# Patient Record
Sex: MALE | Race: WHITE | NOT HISPANIC OR LATINO | URBAN - METROPOLITAN AREA
[De-identification: names, ages, dates, MRNs, and addresses within clinical notes are randomized per-mention and may not be internally consistent; named-entity substitution may affect disease eponyms.]

---

## 2017-06-06 ENCOUNTER — HISTORICAL (OUTPATIENT)
Dept: LAB | Facility: HOSPITAL | Age: 67
End: 2017-06-06

## 2017-06-06 LAB — PSA SERPL-MCNC: 5.36 NG/ML (ref 0–4)

## 2021-05-19 ENCOUNTER — HISTORICAL (OUTPATIENT)
Dept: ADMINISTRATIVE | Facility: HOSPITAL | Age: 71
End: 2021-05-19

## 2021-05-28 ENCOUNTER — HISTORICAL (OUTPATIENT)
Dept: RADIOLOGY | Facility: HOSPITAL | Age: 71
End: 2021-05-28

## 2021-05-28 LAB — POC CREATININE: 1.1 MG/DL (ref 0.6–1.3)

## 2021-08-11 ENCOUNTER — HISTORICAL (OUTPATIENT)
Dept: LAB | Facility: HOSPITAL | Age: 71
End: 2021-08-11

## 2021-08-11 LAB
BUN SERPL-MCNC: 16.4 MG/DL (ref 8.4–25.7)
CALCIUM SERPL-MCNC: 9.7 MG/DL (ref 8.8–10)
CHLORIDE SERPL-SCNC: 103 MMOL/L (ref 98–107)
CO2 SERPL-SCNC: 24 MMOL/L (ref 23–31)
CREAT SERPL-MCNC: 0.79 MG/DL (ref 0.73–1.18)
CREAT/UREA NIT SERPL: 21
GLUCOSE SERPL-MCNC: 95 MG/DL (ref 82–115)
POTASSIUM SERPL-SCNC: 3.5 MMOL/L (ref 3.5–5.1)
SODIUM SERPL-SCNC: 140 MMOL/L (ref 136–145)

## 2021-08-19 ENCOUNTER — HISTORICAL (OUTPATIENT)
Dept: LAB | Facility: HOSPITAL | Age: 71
End: 2021-08-19

## 2021-08-19 LAB
BUN SERPL-MCNC: 25.3 MG/DL (ref 8.4–25.7)
CALCIUM SERPL-MCNC: 9.8 MG/DL (ref 8.8–10)
CHLORIDE SERPL-SCNC: 105 MMOL/L (ref 98–107)
CO2 SERPL-SCNC: 25 MMOL/L (ref 23–31)
CREAT SERPL-MCNC: 1.09 MG/DL (ref 0.73–1.18)
CREAT/UREA NIT SERPL: 23
GLUCOSE SERPL-MCNC: 89 MG/DL (ref 82–115)
POTASSIUM SERPL-SCNC: 4.1 MMOL/L (ref 3.5–5.1)
SODIUM SERPL-SCNC: 140 MMOL/L (ref 136–145)

## 2021-08-26 ENCOUNTER — HISTORICAL (OUTPATIENT)
Dept: LAB | Facility: HOSPITAL | Age: 71
End: 2021-08-26

## 2021-08-26 LAB
BUN SERPL-MCNC: 18.8 MG/DL (ref 8.4–25.7)
CALCIUM SERPL-MCNC: 9.9 MG/DL (ref 8.8–10)
CHLORIDE SERPL-SCNC: 104 MMOL/L (ref 98–107)
CO2 SERPL-SCNC: 28 MMOL/L (ref 23–31)
CREAT SERPL-MCNC: 1.09 MG/DL (ref 0.73–1.18)
CREAT/UREA NIT SERPL: 17
GLUCOSE SERPL-MCNC: 112 MG/DL (ref 82–115)
POTASSIUM SERPL-SCNC: 3.9 MMOL/L (ref 3.5–5.1)
SODIUM SERPL-SCNC: 142 MMOL/L (ref 136–145)

## 2021-09-13 ENCOUNTER — HISTORICAL (OUTPATIENT)
Dept: ADMINISTRATIVE | Facility: HOSPITAL | Age: 71
End: 2021-09-13

## 2021-09-13 ENCOUNTER — HISTORICAL (OUTPATIENT)
Dept: LAB | Facility: HOSPITAL | Age: 71
End: 2021-09-13

## 2021-09-13 LAB
ABS NEUT (OLG): 6.51 X10(3)/MCL (ref 2.1–9.2)
ALBUMIN SERPL-MCNC: 3.9 GM/DL (ref 3.4–4.8)
ALBUMIN/GLOB SERPL: 1.2 RATIO (ref 1.1–2)
ALP SERPL-CCNC: 81 UNIT/L (ref 40–150)
ALT SERPL-CCNC: 66 UNIT/L (ref 0–55)
AST SERPL-CCNC: 37 UNIT/L (ref 5–34)
BASOPHILS # BLD AUTO: 0.07 X10(3)/MCL (ref 0–0.2)
BASOPHILS NFR BLD AUTO: 0.8 % (ref 0–0.9)
BILIRUB SERPL-MCNC: 0.7 MG/DL (ref 0.2–1.2)
BILIRUBIN DIRECT+TOT PNL SERPL-MCNC: 0.3 MG/DL (ref 0–0.5)
BILIRUBIN DIRECT+TOT PNL SERPL-MCNC: 0.4 MG/DL (ref 0–0.8)
BUN SERPL-MCNC: 21 MG/DL (ref 8.4–25.7)
CALCIUM SERPL-MCNC: 10.3 MG/DL (ref 8.8–10)
CHLORIDE SERPL-SCNC: 103 MMOL/L (ref 98–107)
CO2 SERPL-SCNC: 26 MMOL/L (ref 23–31)
CREAT SERPL-MCNC: 1.03 MG/DL (ref 0.72–1.25)
EOSINOPHIL # BLD AUTO: 0.15 X10(3)/MCL (ref 0–0.9)
EOSINOPHIL NFR BLD AUTO: 1.6 % (ref 0–6.5)
ERYTHROCYTE [DISTWIDTH] IN BLOOD BY AUTOMATED COUNT: 13.6 % (ref 11.5–17)
EST. AVERAGE GLUCOSE BLD GHB EST-MCNC: 122.6 MG/DL
GLOBULIN SER-MCNC: 3.3 GM/DL (ref 2.4–3.5)
GLUCOSE SERPL-MCNC: 104 MG/DL (ref 82–115)
HBA1C MFR BLD: 5.9 %
HCT VFR BLD AUTO: 56.4 % (ref 42–52)
HGB BLD-MCNC: 19.1 GM/DL (ref 14–18)
IMM GRANULOCYTES # BLD AUTO: 0.02 10*3/UL (ref 0–0.02)
IMM GRANULOCYTES NFR BLD AUTO: 0.2 % (ref 0–0.43)
LYMPHOCYTES # BLD AUTO: 1.74 X10(3)/MCL (ref 0.6–4.6)
LYMPHOCYTES NFR BLD AUTO: 18.7 % (ref 16.2–38.3)
MCH RBC QN AUTO: 31.3 PG (ref 27–31)
MCHC RBC AUTO-ENTMCNC: 33.9 GM/DL (ref 33–36)
MCV RBC AUTO: 92.5 FL (ref 80–94)
MONOCYTES # BLD AUTO: 0.83 X10(3)/MCL (ref 0.1–1.3)
MONOCYTES NFR BLD AUTO: 8.9 % (ref 4.7–11.3)
MRSA SCREEN BY PCR: NEGATIVE
NEUTROPHILS # BLD AUTO: 6.51 X10(3)/MCL (ref 2.1–9.2)
NEUTROPHILS NFR BLD AUTO: 69.8 % (ref 49.1–73.4)
NRBC BLD AUTO-RTO: 0 % (ref 0–0.2)
PLATELET # BLD AUTO: 194 X10(3)/MCL (ref 130–400)
PMV BLD AUTO: 9.9 FL (ref 7.4–10.4)
POTASSIUM SERPL-SCNC: 4.4 MMOL/L (ref 3.5–5.1)
PROT SERPL-MCNC: 7.2 GM/DL (ref 5.8–7.6)
RBC # BLD AUTO: 6.1 X10(6)/MCL (ref 4.7–6.1)
SODIUM SERPL-SCNC: 141 MMOL/L (ref 136–145)
WBC # SPEC AUTO: 9.3 X10(3)/MCL (ref 4.5–11.5)

## 2021-09-15 ENCOUNTER — HISTORICAL (OUTPATIENT)
Dept: LAB | Facility: HOSPITAL | Age: 71
End: 2021-09-15

## 2021-09-15 LAB
BUN SERPL-MCNC: 18.3 MG/DL (ref 8.4–25.7)
CALCIUM SERPL-MCNC: 9.1 MG/DL (ref 8.8–10)
CHLORIDE SERPL-SCNC: 101 MMOL/L (ref 98–107)
CO2 SERPL-SCNC: 26 MMOL/L (ref 23–31)
CREAT SERPL-MCNC: 0.84 MG/DL (ref 0.73–1.18)
CREAT/UREA NIT SERPL: 22
GLUCOSE SERPL-MCNC: 124 MG/DL (ref 82–115)
POTASSIUM SERPL-SCNC: 4.3 MMOL/L (ref 3.5–5.1)
SODIUM SERPL-SCNC: 137 MMOL/L (ref 136–145)

## 2021-10-15 ENCOUNTER — HISTORICAL (OUTPATIENT)
Dept: ADMINISTRATIVE | Facility: HOSPITAL | Age: 71
End: 2021-10-15

## 2021-10-16 ENCOUNTER — HOSPITAL ENCOUNTER (OUTPATIENT)
Dept: MEDSURG UNIT | Facility: HOSPITAL | Age: 71
End: 2021-10-18
Attending: INTERNAL MEDICINE | Admitting: SPECIALIST

## 2021-10-16 LAB
ABS NEUT (OLG): 5.55 X10(3)/MCL (ref 2.1–9.2)
ALBUMIN SERPL-MCNC: 3.5 GM/DL (ref 3.4–4.8)
ALBUMIN/GLOB SERPL: 1.2 RATIO (ref 1.1–2)
ALP SERPL-CCNC: 75 UNIT/L (ref 40–150)
ALT SERPL-CCNC: 29 UNIT/L (ref 0–55)
AMPHET UR QL SCN: NEGATIVE
APPEARANCE, UA: ABNORMAL
AST SERPL-CCNC: 33 UNIT/L (ref 5–34)
BACTERIA SPEC CULT: ABNORMAL /HPF
BARBITURATE SCN PRESENT UR: NEGATIVE
BASOPHILS # BLD AUTO: 0.1 X10(3)/MCL (ref 0–0.2)
BASOPHILS NFR BLD AUTO: 1 %
BENZODIAZ UR QL SCN: NEGATIVE
BILIRUB SERPL-MCNC: 0.9 MG/DL
BILIRUB UR QL STRIP: NEGATIVE
BILIRUBIN DIRECT+TOT PNL SERPL-MCNC: 0.4 MG/DL (ref 0–0.5)
BILIRUBIN DIRECT+TOT PNL SERPL-MCNC: 0.5 MG/DL (ref 0–0.8)
BUN SERPL-MCNC: 28.8 MG/DL (ref 8.4–25.7)
CALCIUM SERPL-MCNC: 9.6 MG/DL (ref 8.8–10)
CANNABINOIDS UR QL SCN: NEGATIVE
CHLORIDE SERPL-SCNC: 100 MMOL/L (ref 98–107)
CO2 SERPL-SCNC: 25 MMOL/L (ref 23–31)
COCAINE UR QL SCN: NEGATIVE
COLOR UR: ABNORMAL
CREAT SERPL-MCNC: 1.09 MG/DL (ref 0.73–1.18)
EOSINOPHIL # BLD AUTO: 0.2 X10(3)/MCL (ref 0–0.9)
EOSINOPHIL NFR BLD AUTO: 3 %
ERYTHROCYTE [DISTWIDTH] IN BLOOD BY AUTOMATED COUNT: 14 % (ref 11.5–17)
FENTANYL UR QL SCN: NEGATIVE
FOLATE SERPL-MCNC: 11 NG/ML (ref 7–31.4)
GLOBULIN SER-MCNC: 3 GM/DL (ref 2.4–3.5)
GLUCOSE (UA): NEGATIVE
GLUCOSE SERPL-MCNC: 114 MG/DL (ref 82–115)
HCT VFR BLD AUTO: 42.1 % (ref 42–52)
HGB BLD-MCNC: 13.7 GM/DL (ref 14–18)
HGB UR QL STRIP: ABNORMAL
KETONES UR QL STRIP: NEGATIVE
LEUKOCYTE ESTERASE UR QL STRIP: ABNORMAL
LYMPHOCYTES # BLD AUTO: 0.6 X10(3)/MCL (ref 0.6–4.6)
LYMPHOCYTES NFR BLD AUTO: 8 %
MAGNESIUM SERPL-MCNC: 2.1 MG/DL (ref 1.6–2.6)
MCH RBC QN AUTO: 30.9 PG (ref 27–31)
MCHC RBC AUTO-ENTMCNC: 32.5 GM/DL (ref 33–36)
MCV RBC AUTO: 94.8 FL (ref 80–94)
MDMA UR QL SCN: NEGATIVE
MONOCYTES # BLD AUTO: 0.9 X10(3)/MCL (ref 0.1–1.3)
MONOCYTES NFR BLD AUTO: 12 %
NEUTROPHILS # BLD AUTO: 5.55 X10(3)/MCL (ref 2.1–9.2)
NEUTROPHILS NFR BLD AUTO: 74 %
NITRITE UR QL STRIP: NEGATIVE
OPIATES UR QL SCN: POSITIVE
PCP UR QL: NEGATIVE
PH UR STRIP.AUTO: 6 [PH] (ref 5–7.5)
PH UR STRIP: 6 [PH] (ref 5–9)
PLATELET # BLD AUTO: 367 X10(3)/MCL (ref 130–400)
PMV BLD AUTO: 9.7 FL (ref 9.4–12.4)
POC TROPONIN: 0.01 NG/ML (ref 0–0.08)
POC TROPONIN: 0.02 NG/ML (ref 0–0.08)
POTASSIUM SERPL-SCNC: 4.6 MMOL/L (ref 3.5–5.1)
POTASSIUM SERPL-SCNC: 5.3 MMOL/L (ref 3.5–5.1)
POTASSIUM SERPL-SCNC: 5.4 MMOL/L (ref 3.5–5.1)
PROT SERPL-MCNC: 6.5 GM/DL (ref 5.8–7.6)
PROT UR QL STRIP: NEGATIVE
RBC # BLD AUTO: 4.44 X10(6)/MCL (ref 4.7–6.1)
RBC #/AREA URNS HPF: 20 /HPF (ref 0–2)
SARS-COV-2 AG RESP QL IA.RAPID: NEGATIVE
SODIUM SERPL-SCNC: 134 MMOL/L (ref 136–145)
SP GR FLD REFRACTOMETRY: 1.02 (ref 1–1.03)
SP GR UR STRIP: 1.02 (ref 1–1.03)
SQUAMOUS EPITHELIAL, UA: ABNORMAL /HPF (ref 0–4)
TROPONIN I SERPL-MCNC: <0.01 NG/ML (ref 0–0.04)
TSH SERPL-ACNC: 0.89 UIU/ML (ref 0.35–4.94)
TSH SERPL-ACNC: 1.39 UIU/ML (ref 0.35–4.94)
UROBILINOGEN UR STRIP-ACNC: 1
VIT B12 SERPL-MCNC: 645 PG/ML (ref 213–816)
WBC # SPEC AUTO: 7.4 X10(3)/MCL (ref 4.5–11.5)
WBC #/AREA URNS HPF: 133 /HPF (ref 0–3)

## 2021-10-17 LAB
ABS NEUT (OLG): 4.51 X10(3)/MCL (ref 2.1–9.2)
ALBUMIN SERPL-MCNC: 3.1 GM/DL (ref 3.4–4.8)
ALBUMIN/GLOB SERPL: 1 RATIO (ref 1.1–2)
ALP SERPL-CCNC: 62 UNIT/L (ref 40–150)
ALT SERPL-CCNC: 23 UNIT/L (ref 0–55)
AST SERPL-CCNC: 26 UNIT/L (ref 5–34)
BASOPHILS # BLD AUTO: 0.1 X10(3)/MCL (ref 0–0.2)
BASOPHILS NFR BLD AUTO: 1 %
BILIRUB SERPL-MCNC: 0.8 MG/DL
BILIRUBIN DIRECT+TOT PNL SERPL-MCNC: 0.4 MG/DL (ref 0–0.5)
BILIRUBIN DIRECT+TOT PNL SERPL-MCNC: 0.4 MG/DL (ref 0–0.8)
BUN SERPL-MCNC: 17.6 MG/DL (ref 8.4–25.7)
CALCIUM SERPL-MCNC: 10.1 MG/DL (ref 8.8–10)
CHLORIDE SERPL-SCNC: 106 MMOL/L (ref 98–107)
CO2 SERPL-SCNC: 22 MMOL/L (ref 23–31)
CREAT SERPL-MCNC: 0.82 MG/DL (ref 0.73–1.18)
EOSINOPHIL # BLD AUTO: 0.2 X10(3)/MCL (ref 0–0.9)
EOSINOPHIL NFR BLD AUTO: 3 %
ERYTHROCYTE [DISTWIDTH] IN BLOOD BY AUTOMATED COUNT: 14 % (ref 11.5–17)
GLOBULIN SER-MCNC: 3.2 GM/DL (ref 2.4–3.5)
GLUCOSE SERPL-MCNC: 110 MG/DL (ref 82–115)
HCT VFR BLD AUTO: 43 % (ref 42–52)
HGB BLD-MCNC: 13.8 GM/DL (ref 14–18)
LYMPHOCYTES # BLD AUTO: 0.9 X10(3)/MCL (ref 0.6–4.6)
LYMPHOCYTES NFR BLD AUTO: 14 %
MAGNESIUM SERPL-MCNC: 1.8 MG/DL (ref 1.6–2.6)
MCH RBC QN AUTO: 30.3 PG (ref 27–31)
MCHC RBC AUTO-ENTMCNC: 32.1 GM/DL (ref 33–36)
MCV RBC AUTO: 94.5 FL (ref 80–94)
MONOCYTES # BLD AUTO: 0.7 X10(3)/MCL (ref 0.1–1.3)
MONOCYTES NFR BLD AUTO: 11 %
NEUTROPHILS # BLD AUTO: 4.51 X10(3)/MCL (ref 2.1–9.2)
NEUTROPHILS NFR BLD AUTO: 70 %
PLATELET # BLD AUTO: 310 X10(3)/MCL (ref 130–400)
PMV BLD AUTO: 9.3 FL (ref 9.4–12.4)
POTASSIUM SERPL-SCNC: 4.6 MMOL/L (ref 3.5–5.1)
PROT SERPL-MCNC: 6.3 GM/DL (ref 5.8–7.6)
RBC # BLD AUTO: 4.55 X10(6)/MCL (ref 4.7–6.1)
SODIUM SERPL-SCNC: 136 MMOL/L (ref 136–145)
WBC # SPEC AUTO: 6.4 X10(3)/MCL (ref 4.5–11.5)

## 2021-10-18 LAB
ABS NEUT (OLG): 6.17 X10(3)/MCL (ref 2.1–9.2)
BASOPHILS # BLD AUTO: 0.1 X10(3)/MCL (ref 0–0.2)
BASOPHILS NFR BLD AUTO: 1 %
BUN SERPL-MCNC: 20 MG/DL (ref 8.4–25.7)
CALCIUM SERPL-MCNC: 10.1 MG/DL (ref 8.8–10)
CALCIUM SERPL-MCNC: 10.3 MG/DL (ref 8.7–10.5)
CHLORIDE SERPL-SCNC: 102 MMOL/L (ref 98–107)
CO2 SERPL-SCNC: 27 MMOL/L (ref 23–31)
CREAT SERPL-MCNC: 0.79 MG/DL (ref 0.73–1.18)
CREAT/UREA NIT SERPL: 25
EOSINOPHIL # BLD AUTO: 0.2 X10(3)/MCL (ref 0–0.9)
EOSINOPHIL NFR BLD AUTO: 2 %
ERYTHROCYTE [DISTWIDTH] IN BLOOD BY AUTOMATED COUNT: 14.1 % (ref 11.5–17)
FINAL CULTURE: NORMAL
GLUCOSE SERPL-MCNC: 112 MG/DL (ref 82–115)
HCT VFR BLD AUTO: 44.8 % (ref 42–52)
HGB BLD-MCNC: 14.7 GM/DL (ref 14–18)
LYMPHOCYTES # BLD AUTO: 1 X10(3)/MCL (ref 0.6–4.6)
LYMPHOCYTES NFR BLD AUTO: 12 %
MCH RBC QN AUTO: 30.8 PG (ref 27–31)
MCHC RBC AUTO-ENTMCNC: 32.8 GM/DL (ref 33–36)
MCV RBC AUTO: 93.7 FL (ref 80–94)
MONOCYTES # BLD AUTO: 0.8 X10(3)/MCL (ref 0.1–1.3)
MONOCYTES NFR BLD AUTO: 10 %
NEUTROPHILS # BLD AUTO: 6.17 X10(3)/MCL (ref 2.1–9.2)
NEUTROPHILS NFR BLD AUTO: 74 %
PLATELET # BLD AUTO: 360 X10(3)/MCL (ref 130–400)
PMV BLD AUTO: 9.6 FL (ref 9.4–12.4)
POTASSIUM SERPL-SCNC: 4.8 MMOL/L (ref 3.5–5.1)
RBC # BLD AUTO: 4.78 X10(6)/MCL (ref 4.7–6.1)
SODIUM SERPL-SCNC: 139 MMOL/L (ref 136–145)
WBC # SPEC AUTO: 8.4 X10(3)/MCL (ref 4.5–11.5)

## 2021-10-21 LAB
FINAL CULTURE: NORMAL
FINAL CULTURE: NORMAL

## 2022-02-14 ENCOUNTER — HISTORICAL (OUTPATIENT)
Dept: LAB | Facility: HOSPITAL | Age: 72
End: 2022-02-14

## 2022-02-14 LAB
BUN SERPL-MCNC: 10.9 MG/DL (ref 8.4–25.7)
CALCIUM SERPL-MCNC: 10 MG/DL (ref 8.7–10.5)
CHLORIDE SERPL-SCNC: 102 MMOL/L (ref 98–107)
CO2 SERPL-SCNC: 28 MMOL/L (ref 23–31)
CREAT SERPL-MCNC: 0.69 MG/DL (ref 0.73–1.18)
CREAT/UREA NIT SERPL: 16
GLUCOSE SERPL-MCNC: 106 MG/DL (ref 82–115)
HEMOLYSIS INTERF INDEX SERPL-ACNC: 12
ICTERIC INTERF INDEX SERPL-ACNC: 1
LIPEMIC INTERF INDEX SERPL-ACNC: 10
POTASSIUM SERPL-SCNC: 3.7 MMOL/L (ref 3.5–5.1)
SODIUM SERPL-SCNC: 139 MMOL/L (ref 136–145)

## 2022-04-10 ENCOUNTER — HISTORICAL (OUTPATIENT)
Dept: ADMINISTRATIVE | Facility: HOSPITAL | Age: 72
End: 2022-04-10
Payer: MEDICARE

## 2022-04-30 VITALS
HEIGHT: 67 IN | HEIGHT: 67 IN | WEIGHT: 240.75 LBS | DIASTOLIC BLOOD PRESSURE: 90 MMHG | BODY MASS INDEX: 37.79 KG/M2 | DIASTOLIC BLOOD PRESSURE: 97 MMHG | BODY MASS INDEX: 39.24 KG/M2 | WEIGHT: 240.06 LBS | SYSTOLIC BLOOD PRESSURE: 167 MMHG | SYSTOLIC BLOOD PRESSURE: 120 MMHG | SYSTOLIC BLOOD PRESSURE: 151 MMHG | DIASTOLIC BLOOD PRESSURE: 65 MMHG | WEIGHT: 250 LBS | BODY MASS INDEX: 37.68 KG/M2 | HEIGHT: 67 IN

## 2022-05-02 NOTE — HISTORICAL [facility]
This is a historical note converted from Crispin. Formatting and pictures may have been removed.  Please reference Crispin for original formatting and attached multimedia. Chief Complaint  Here for right knee pain. Patient stated julio cesar has been hurting for years.  History of Present Illness  70-year-old male presents office today for evaluation his right knee pain. ?Is been present for many years and medial sided. ?He does admit to?swelling. ?Denies any locking, catching, giving way episodes. ?This has?affected his activities of daily living.  Review of Systems  Systemic: No fever, no chills, and no recent weight change.  Head: No headache - frequent.  Eyes: No vision problems.  Otolarnygeal: No hearing loss, no earache, no epistaxis, no hoarseness, and no tooth pain. Gums normal.  Cardiovascular: No chest pain or discomfort and no palpitations.  Pulmonary: No pulmonary symptoms - no dyspnea, no shortness of breath  Gastrointestinal: Appetite not decreased. No dysphagia and no constant eructation. No nausea, no vomiting, no abdominal pain, no hematochezia.  Genitourinary: No genitourinary symptoms - No urinary hesitancy. No urinary loss of control - no burning sensation during urination.  Musculoskeletal: No calf muscle cramps and no localized soft tissue swelling  Neurological: No fainting and no convulsions.  Psychological: no depression.  Skin: No rash.  Physical Exam  Vitals & Measurements  T:?36.2? ?C (Oral)? HR:?71(Peripheral)? BP:?151/97?  HT:?170.00?cm? WT:?113.390?kg? BMI:?39.24?  PHYSICAL FINDINGS  Cardiovascular:  Arterial Pulses: Posterior tibialis pulses were normal. Dorsalis pedis pulses were normal right.  Musculoskeletal System:  Thigh:  ?Thigh: Thigh showed quadriceps atrophy.  Knee:  right?Knee:  Grade effusion 1  Bilateral lower extremity edema  Genu varum. Patella demonstrated crepitus. Anteromedial aspect was tender on palpation. Medial aspect was tender on palpation. Medial collateral ligament  was tender on palpation. Active motion.  right?Knee:  Knee Motion: Value  Active flexion?120 degrees  Active extension?5 degrees  Pain was elicited by flexion. No erythema. No warmth. No medial instability. No lateral instability. No one plane medial (straight) instability. No one plane lateral (straight) instability. A Lachman test did not demonstrate one plane anterior instability.  Neurological:  Gait And Stance: A right-sided antalgic gait was observed.  ???  ???  TESTS  Imaging:  X-Ray Knee:  A complete knee x-ray with standing views was performed -of?right knee.  AP and lateral view x-rays of the Right knee with sunrise view of the patella were performed -of?right knee.  ???  ???  IMPRESSIONS RADIOLOGY TEST  Narrowing of the joint space bone on bone in medial and patellofemoral compartment, and osteophytes arising from the?right knee. large cyst seen in fibular head  Kellgren Elvin grade 4 changes  Assessment/Plan  1.?Primary osteoarthritis of right knee?M17.11  ?Discussed robotic assisted right total knee arthroplasty  MRI with and without contrast to evaluate?fibular head cyst. ?Follow-up in 1 to 2 weeks to discuss test results.? Also recommend that he gets back in with his PCP or cardiologist in regards to his lower extremity edema  Ordered:  Clinic Follow up, *Est. 06/02/21 3:00:00 CDT, Order for future visit, Primary osteoarthritis of right knee  Bone cyst of right fibula, [facility]  ?  2.?Bone cyst of right fibula?M85.661  ?MRI?with and without contrast of the right knee to evaluate right fibular head cyst  Ordered:  Clinic Follow up, *Est. 06/02/21 3:00:00 CDT, Order for future visit, Primary osteoarthritis of right knee  Bone cyst of right fibula, [facility]  MRI Ext Lower Joint Right W W/O Cont, Routine, *Est. 05/19/21 3:00:00 CDT, Other (please specify), Bone lesion, knee, benign features, None, Ambulatory, right knee fibular head cyst, Creatinine if needed per protocol, Rad Type, Order  for future visit, Bone cyst of right fibula, Schedule...  ?  Referrals  Clinic Follow up, *Est. 06/02/21 3:00:00 CDT, Order for future visit, Primary osteoarthritis of right knee  Bone cyst of right fibula, [facility]   Problem List/Past Medical History  Ongoing  Bone cyst of right fibula  Central sleep apnea  Cheyne-Beavers respiration  Edema  Fatigue  Hypertension  Hypoxemia  Obstructive sleep apnea  Primary osteoarthritis of right knee  REM behavioral disorder  Snoring  Tobacco user  Historical  No qualifying data  Medications  amLODIPine 10 mg oral tablet, 10 mg= 1 tab(s), Oral, Daily  amlodipine-valsartan 10 mg-320 mg oral tablet, 1 tab(s), Oral, Daily,? ?Not taking  amLODIPine-valsartan 5 mg-320 mg oral tablet, 1 tab(s), Oral, Daily,? ?Not taking  celecoxib 200 mg oral capsule, Daily,? ?Not taking  Eliquis 5 mg oral tablet, Oral, BID  hydrochlorothiazide 25 mg oral tablet, 25 mg= 1 tab(s), Oral, Daily,? ?Not taking  losartan 100 mg oral tablet, Oral, Daily  metoprolol succinate 100 mg oral tablet, extended release, 100 mg= 1 tab(s), Oral, Daily,? ?Not taking  metoprolol tartrate 100 mg oral tab, At Bedtime  Pantoprazole 40 mg ORAL EC-Tablet,? ?Not taking  pramipexole 0.25 mg oral tablet, 0.25 mg= 1 tab(s), Oral, At Bedtime, 5 refills,? ?Not taking  tamsulosin 0.4 mg oral capsule, 0.4 mg= 1 cap(s), Oral, Daily  valsartan 320 mg oral tablet, 320 mg= 1 tab(s), Oral, Daily  venlafaxine 75 mg oral capsule, extended release, 75 mg= 1 cap(s), Oral, Daily,? ?Not taking  venlafaxine 75 mg oral tablet, Daily  Allergies  Advil?(unknown)  ibuprofen?(unknown)  Social History  Abuse/Neglect  No, 05/19/2021  Tobacco  Former smoker, quit more than 30 days ago, No, 05/19/2021  Family History  Hypertension: Father.  Health Maintenance  Health Maintenance  ???Pending?(in the next year)  ??? ??OverDue  ??? ? ? ?Hypertension Management-BMP due??10/27/17??and every 1??year(s)  ??? ? ? ?Diabetes Screening due??10/27/19??and  every 3??year(s)  ??? ? ? ?Influenza Vaccine due??10/01/20??and every 1??day(s)  ??? ? ? ?Smoking Cessation due??01/01/21??and every 1??year(s)  ??? ? ? ?Advance Directive due??01/02/21??and every 1??year(s)  ??? ? ? ?Alcohol Misuse Screening due??01/02/21??and every 1??year(s)  ??? ? ? ?Cognitive Screening due??01/02/21??and every 1??year(s)  ??? ? ? ?Functional Assessment due??01/02/21??and every 1??year(s)  ??? ??Due?  ??? ? ? ?ADL Screening due??05/19/21??and every 1??year(s)  ??? ? ? ?Abdominal Aortic Aneurysm Screening due??05/19/21??and every 100??year(s)  ??? ? ? ?Aspirin Therapy for CVD Prevention due??05/19/21??and every 1??year(s)  ??? ? ? ?Colorectal Screening due??05/19/21??Unknown Frequency  ??? ? ? ?Depression Screening due??05/19/21??Unknown Frequency  ??? ? ? ?Hypertension Management-Education due??05/19/21??and every 1??year(s)  ??? ? ? ?Lipid Screening due??05/19/21??Unknown Frequency  ??? ? ? ?Lung Cancer Screening due??05/19/21??and every 1??year(s)  ??? ? ? ?Medicare Annual Wellness Exam due??05/19/21??and every 1??year(s)  ??? ? ? ?Pneumococcal Vaccine due??05/19/21??Unknown Frequency  ??? ? ? ?Tetanus Vaccine due??05/19/21??and every 10??year(s)  ??? ? ? ?Zoster Vaccine due??05/19/21??Unknown Frequency  ??? ??Due In Future?  ??? ? ? ?Blood Pressure Screening not due until??12/08/21??and every 1??year(s)  ??? ? ? ?Hypertension Management-Blood Pressure not due until??12/08/21??and every 1??year(s)  ??? ? ? ?Obesity Screening not due until??01/01/22??and every 1??year(s)  ??? ? ? ?Fall Risk Assessment not due until??01/02/22??and every 1??year(s)  ??? ? ? ?Body Mass Index Check not due until??02/03/22??and every 1??year(s)  ???Satisfied?(in the past 1 year)  ??? ??Satisfied?  ??? ? ? ?Blood Pressure Screening on??05/19/21.??Satisfied by Marissa Cee LPN  ??? ? ? ?Body Mass Index Check on??05/19/21.??Satisfied by Marissa Cee LPN  ??? ? ? ?Depression Screening on??05/19/21.??Satisfied  by Marissa Cee LPN  ??? ? ? ?Fall Risk Assessment on??05/19/21.??Satisfied by Marissa Cee LPN  ??? ? ? ?Hypertension Management-Blood Pressure on??05/19/21.??Satisfied by Marissa Cee LPN  ??? ? ? ?Influenza Vaccine on??02/03/21.??Satisfied by Nolberto Mosquera  ??? ? ? ?Obesity Screening on??05/19/21.??Satisfied by Marissa Cee LPN  ?

## 2022-05-02 NOTE — HISTORICAL [facility]
This is a historical note converted from Crispin. Formatting and pictures may have been removed.  Please reference Crispin for original formatting and attached multimedia. Chief Complaint  Confusion  History of Present Illness  Nico Devi is a 71-year-old gentleman?with a history of chronic atrial?flutter on Eliquis,?hypertension,?and RAVINDER?presents to the ED?with complaints of?progressively worsening?intermittent?confusion,?rambling speech,?visual/auditory?hallucinations, and?a fever (T-max?around 101.0)?that began last night?on 10/15/2021.??His hallucinations were described as?both?seeing and talking?to people?who were not present, but he denies any current hallucinations.? He recently?underwent a right TKA?on 09/30/2021?without any complications,?but?prior to discharge?he developed urinary retention requiring?a urology consult and Quinonez catheter placement.? He was eventually discharged home?with a Quinonez catheter in place?on 10/03/2021?and with prescriptions including Norco 7.5 mg - 325 mg (last dose was in the morning on 10/15/2021)?and methocarbamol 750 mg TID (last dose x2 days ago), which he has been taking as prescribed.? He followed up?with his urologist?on 10/11/2021?to have the Quinonez catheter removed?and has not had?any urinary retention since then.? He did notice?some hematuria?so he went back?to the urologist?who prescribed him Bactrim?on 10/12/2021, which he has been taking.? He has not experienced similar symptoms in the past?and denies previous history of CVAs.? Per wife at bedside,?he is currently at his baseline?cognitively?and?is conversing appropriately.? He denies chest pain,?cough,?shortness of breath,?abdominal pain,?or N/V/D. ?He also had a follow-up?with his orthopedic surgeon?yesterday on 10/15/2021?where he had the staples removed?from his incision site?and was informed that is healing well.  ?  His vital signs are currently stable post blood pressure is elevated at 166/84. ?His labs showed  a potassium 5.3, but otherwise was mostly unremarkable. ?His urinalysis showed 2+ blood, 3+ leukoesterase, , and RBC 20. ?COVID-19 negative. CT head showed no acute cortical infarct, hemorrhage or mass lesion. CXR showed no acute cardiopulmonary process identified.  Review of Systems  As per HPI otherwise all systems reviewed and negative.  Physical Exam  Vitals & Measurements  T:?36.9? ?C (Oral)? HR:?69(Peripheral)? RR:?18? BP:?106/63? SpO2:?95%?  General:?well-developed well-nourished in no acute distress, AAO x4,?answers questions appropriately, no slurred speech?or facial droop appreciated  Eye: PERRLA, EOMI, clear conjunctiva, eyelids normal  HENT:? oropharynx without erythema/exudate, oropharynx and nasal mucosal surfaces moist  Neck: full range of motion  Respiratory:?clear to auscultation bilaterally  Cardiovascular:?regularly?irregular rate and rhythm without murmurs, gallops or rubs  Gastrointestinal:?soft, non-tender, non-distended with normal bowel sounds, without masses to palpation  Genitourinary: no CVA tenderness to palpation  Musculoskeletal:?Full range of motion?of bilateral upper extremities and LLE, limited ROM?of RLE secondary to recent TKA  Integumentary: warm, dry, healing?incision site over right anterior knee s/p TKA?with Steri-Strips in place,?no active?drainage  Neurologic: cranial nerves intact, motor/sensory function intact  Assessment/Plan  Acute delirium/psychosis possibly?secondary?to a UTI versus?new medications?versus other causes  Acute bacterial cystitis  Mild hyperkalemia  Mild macrocytic anemia  S/p recent?right TKA on 09/30/2021  Chronic atrial flutter on Eliquis  Hypertension  RAVINDER  ?  Plan:  -Continue with gentle IV hydration  -Blood cultures x2, urine culture-pending  -Continue with ceftriaxone, can change or adjust antibiotics necessary once cultures result  -Received?x1 dose?of Lokelma?while in the ED?which should improve mild hypokalemia  -B12, folate, TSH,  UDS-pending  -His acute psychosis?may be a direct result?of his recent pain medications?after surgery?and with a recent addition of Bactrim?on 10/12/2021, so hold?narcotics?and Bactrim  -will continue physical therapy while he is here for his right TKA?in order to prevent stiffness  -repeat labs in AM  ?  DVT prophylaxis: SCDs  Code Status: Full  PCP: Edgar MONTENEGRO, [place]  ?  I, Min Pulido PA-C, discussed this case with Dr. Gonzalez.  Please see addendum for further assessment and plan per MD.  ?   ADDENDUM:  I,?Damaso Gonzalez?assumed care of this patient today at?12:45pm, at the time of this addendum.  For this patient encounter, I reviewed the PA documentation, treatment plan, and medical decision making; and I had face-to-face time with this patient. ?Labs and imaging were reviewed and I agree with history, physical and medical decision making as detailed above. ?At least 45 minutes have been spent on above H&P, in collaboration with above nurse practitioner  ?  ?   a. History?  71-year-old male with known past medical history of chronic atrial flutter on Eliquis, hypertension, obstructive sleep apnea, history of recent right knee replacement on 9/30/2021 with Dr. Fuentes, 1 incidence of urinary retention requiring Quinonez placement for 2 weeks which has since resolved admitted 10/16/2021 with confusion, hallucination and episodic slurred speech.? Patient wife is at bedside.? Patient report he remembers that he was out of it.? Does remember seeing small children in the room, they were talking to him and he was responding to them as well.? Wife reported that it all started 2 days ago after starting Bactrim for his urinary tract infection.? Patient was on Norco as well as Robaxin.? And did not had that issue till he started the antibiotic.? Currently patient is awake alert and oriented.? Per wife he is at his baseline.? Speech is clear.? Patient was able to provide me the full history.? Requesting ice pack for his right  knee  Labs reviewed, vital signs reviewed.? Noted mild WBC elevation.? UA shows 3+ leukocyte esterase.? Urine culture has been sent.? Potassium is slightly elevated at 5.3.? Patient received Lokelma x1 will repeat his potassium again at 4 PM  ?  b. Physical exam  Gen:?Awake alert and oriented, pleasant  CVS:?S1-S2 positive, regular rate and rhythm. ?No murmur heard  Resp:?Lungs are clear to auscultation bilaterally to the bases  Abd:?Soft, protuberant. ?Ventral hernia noted. ?Bowel sounds positive x4  CNS:?Awake alert and oriented, answers all my questions appropriately  Psych: No hallucinations?since in the hospital  ?   c. Medical decision making  Acute delirium/psychosis possibly?secondary?to a UTI versus?new medications?versus other causes  Acute bacterial cystitis  Mild hyperkalemia  Mild macrocytic anemia  S/p recent?right TKA on 09/30/2021  Chronic atrial flutter on Eliquis  Hypertension  RAVINDER  Ventral Hernia- asymptomatic  ?   Plan:  Admitted to our service under observation?on 10/16/2021?  Continue with gentle IV hydration, once consuming?appropriately by mouth, will discontinue fluids  Blood cultures x2?collected in the ER- result pending  UA with sign of infections  Urine cx collected-> pending results  Continue with ceftriaxone, can change or adjust antibiotics necessary once cultures result, probably on monday  Mild hyperkalemia noted, received?x1 dose?of Lokelma?while in the ED--> repeat K level at 4 pm  Hold valsartan  Resumed all appropriate home medication for chronic medical condition  B12, folate, TSH, UDS-pending  His acute psychosis?may be a direct result?of his recent pain medications?+ muscle relaxer after surgery, with pramipexole also already?on board?and with a recent addition of Bactrim?on 10/12/2021, so i will discontinue Robaxin ,?pramipexole? (pt and wife d3nied hx of RLS, insomnia) but will cont norco for now for his right TKA  Will continue physical therapy while he is here for his  right TKA?in order to prevent stiffness  Ice pack to the surgical knee per pts request  Repeat labs in AM  DVT prophylaxis: SCDs/ eliquis  ?  All diagnosis and differential diagnosis have been reviewed; assessment and plan has been documented; I have personally reviewed the daily vitals, labs and test results that are presently available; I have reviewed the patients medication list; I have reviewed the consulting providers response and recommendations. I have reviewed or attempted to review medical records based upon their availability.  ?   Levon MONTENEGRO   Problem List/Past Medical History  Ongoing  Atrial flutter  Hypertension  Obstructive sleep apnea  Primary osteoarthritis of right knee  Procedure/Surgical History  Excision of Right Knee Joint, Open Approach (09/30/2021)  AKIRA BOSWELL Total Knee Arthroplasty (Right) (09/30/2021)  Replacement of Right Knee Joint with Synthetic Substitute, Cemented, Open Approach (09/30/2021)  Robotic Assisted Procedure of Lower Extremity, Open Approach (09/30/2021)  wisdom teeth   Medications  Inpatient  Lokelma 10 g oral powder for reconstitution, 10 gm= 1 packet(s), Oral, Once  Normal Saline (0.9% NS)  mL, 500 mL, IV  Rocephin (for IVPB)  Home  acetaminophen-hydrocodone 325 mg-7.5 mg oral tablet, 1 tab(s), Oral, q6hr  amLODIPine 2.5 mg oral tablet, 2.5 mg= 1 tab(s), Oral, Daily  carvedilol 3.125 mg oral tablet, 3.125 mg= 1 tab(s), Oral, BID,? ?Not Taking per Prescriber  carvedilol 6.25 mg oral tablet, 6.25 mg= 1 tab(s), Oral, BID  Eliquis 5 mg oral tablet, 5 mg= 1 tab(s), Oral, BID  hydrochlorothiazide 25 mg oral tablet, 25 mg= 1 tab(s), Oral, Daily  methocarbamol 750 mg oral tablet, 750 mg= 1 tab(s), Oral, TID  pramipexole 0.25 mg oral tablet, 0.25 mg= 1 tab(s), Oral, At Bedtime, 5 refills  spironolactone 25 mg oral tablet, 25 mg= 1 tab(s), Oral, Daily,? ?Not Taking per Prescriber  sulfamethoxazole-trimethoprim 800 mg-160 mg oral tablet, 1 tab(s), Oral, BID  tamsulosin 0.4 mg  oral capsule, 0.4 mg= 1 cap(s), Oral, BID  Tylenol, 500 mg= 1 tab(s), Oral, q4hr  valsartan 320 mg oral tablet, 320 mg= 1 tab(s), Oral, Daily  venlafaxine 75 mg oral tablet, 75 mg= 1 tab(s), Oral, Daily  Allergies  Advil?(unknown)  ibuprofen?(unknown)  Social History  Alcohol  Current, Beer, Daily, 09/30/2021  Current, Beer, Daily, 09/23/2021  Current, Daily, 07/26/2021  Substance Use  Never, 09/30/2021  Never, 09/23/2021  Never, 07/26/2021  Tobacco  Smoker, current status unknown, Electronic Device, N/A, 10/15/2021  Family History  Hypertension: Father.  Lab Results  Labs Last 24 Hours?  ?Chemistry? Hematology/Coagulation?   Sodium Lvl:?134 mmol/L?Low (10/16/21 08:34:00) WBC: 7.4 x10(3)/mcL (10/16/21 08:34:00)   Potassium Lvl:?5.3 mmol/L?High (10/16/21 08:34:00) RBC:?4.44 x10(6)/mcL?Low (10/16/21 08:34:00)   Chloride: 100 mmol/L (10/16/21 08:34:00) Hgb:?13.7 gm/dL?Low (10/16/21 08:34:00)   CO2: 25 mmol/L (10/16/21 08:34:00) Hct: 42.1 % (10/16/21 08:34:00)   Calcium Lvl: 9.6 mg/dL (10/16/21 08:34:00) Platelet: 367 x10(3)/mcL (10/16/21 08:34:00)   Magnesium Lvl: 2.1 mg/dL (10/16/21 08:34:00) MCV:?94.8 fL?High (10/16/21 08:34:00)   Glucose Lvl: 114 mg/dL (10/16/21 08:34:00) MCH: 30.9 pg (10/16/21 08:34:00)   BUN:?28.8 mg/dL?High (10/16/21 08:34:00) MCHC:?32.5 gm/dL?Low (10/16/21 08:34:00)   Creatinine: 1.09 mg/dL (10/16/21 08:34:00) RDW: 14 % (10/16/21 08:34:00)   eGFR-AA: >60 (10/16/21 08:34:00) MPV: 9.7 fL (10/16/21 08:34:00)   eGFR-LUIS ANTONIO: >60 (10/16/21 08:34:00) Abs Neut: 5.55 x10(3)/mcL (10/16/21 08:34:00)   Bili Total: 0.9 mg/dL (10/16/21 08:34:00) Neutro Auto: 74 % (10/16/21 08:34:00)   Bili Direct: 0.4 mg/dL (10/16/21 08:34:00) Lymph Auto: 8 % (10/16/21 08:34:00)   Bili Indirect: 0.5 mg/dL (10/16/21 08:34:00) Mono Auto: 12 % (10/16/21 08:34:00)   AST: 33 unit/L (10/16/21 08:34:00) Eos Auto: 3 % (10/16/21 08:34:00)   ALT: 29 unit/L (10/16/21 08:34:00) Abs Eos: 0.2 x10(3)/mcL (10/16/21 08:34:00)   Alk Phos: 75  unit/L (10/16/21 08:34:00) Basophil Auto: 1 % (10/16/21 08:34:00)   Total Protein: 6.5 gm/dL (10/16/21 08:34:00) Abs Neutro: 5.55 x10(3)/mcL (10/16/21 08:34:00)   Albumin Lvl: 3.5 gm/dL (10/16/21 08:34:00) Abs Lymph: 0.6 x10(3)/mcL (10/16/21 08:34:00)   Globulin: 3 gm/dL (10/16/21 08:34:00) Abs Mono: 0.9 x10(3)/mcL (10/16/21 08:34:00)   A/G Ratio: 1.2 ratio (10/16/21 08:34:00) Abs Baso: 0.1 x10(3)/mcL (10/16/21 08:34:00)   Troponin-I: <0.010 (10/16/21 08:34:00)    POC Troponin: 0.01 ng/mL (10/16/21 09:35:00)    TSH: 1.387 uIU/mL (10/16/21 08:34:00)    Diagnostic Results  Reason For Exam  Cough  ?  Radiology Report  ?  CLINICAL: ?Cough.  ?  COMPARISON: September 13, 2021.  ?  FINDINGS: ?Cardiopericardial silhouette is within normal limits.?  Lungs are without dense focal or segmental consolidation, congestion,  pleural effusion or pneumothorax. ?  ?  IMPRESSION:  ?  No acute cardiopulmonary process identified.  ?   Signature Line  Electronically Signed By: Edmar De La Cruz MD  Date/Time Signed: 10/16/2021 09:21  ?   Reason For Exam  Slurred speech  ?  Radiology Report  ?  Clinical History:  Slurred speech  ?  Reference:  None Available.  ?  Technique:  CT imaging of the head performed from the skull base to the vertex  without intravenous contrast.  mGycm. Automatic exposure  control, adjustment of mA/kV or iterative reconstruction technique was  used to reduce radiation.  ?  Findings:  There is no acute cortical infarct, hemorrhage or mass lesion. There  is mild patchy hypoattenuation in the cerebral white matter which is  nonspecific but most commonly associated with chronic small vessel  ischemic changes. The ventricles are not significantly enlarged. There  are vascular calcifications.  ?  Visualized paranasal sinuses and mastoid air cells are clear.  ?  Impression:  No acute cortical infarct, hemorrhage or mass lesion.  ?  ?  Signature Line  Electronically Signed By: Becky MONTENEGRO, Holland Osei  Date/Time  Signed: 10/16/2021 09:37  ?

## 2022-05-02 NOTE — HISTORICAL [facility]
This is a historical note converted from [employer]. Formatting and pictures may have been removed.  Please reference [facility] for original formatting and attached multimedia. Chief Complaint  F/U Rt total knee Sx 9/30/2021-12/29/2021- pt is ambulating with a walker - TD  History of Present Illness  71-year-old male presents office today for follow-up on his right knee. ?He is 2-week status post right total knee arthroplasty.? Overall doing well,?intermittent?complaints of pain and swelling. ?He is ambulating with a walker and attending physical therapy  Review of Systems  Systemic: No fever, no chills, and no recent weight change.  Head: No headache - frequent.  Eyes: No vision problems.  Otolarnygeal: No hearing loss, no earache, no epistaxis, no hoarseness, and no tooth pain. Gums normal.  Cardiovascular: No chest pain or discomfort and no palpitations.  Pulmonary: No pulmonary symptoms - no dyspnea, no shortness of breath  Gastrointestinal: Appetite not decreased. No dysphagia and no constant eructation. No nausea, no vomiting, no abdominal pain, no hematochezia.  Genitourinary: No genitourinary symptoms - No urinary hesitancy. No urinary loss of control - no burning sensation during urination.  Musculoskeletal: No calf muscle cramps and no localized soft tissue swelling  Neurological: No fainting and no convulsions.  Psychological: no depression.  Skin: No rash.  Physical Exam  Vitals & Measurements  BP:?120/65?  HT:?170.00?cm? WT:?108.900?kg? BMI:?37.68?  Musculoskeletal System:  right Knee:  General/bilateral: ? Swelling of the knee. ? Knee demonstrated muscle weakness. ? No warmth of the knee. ? No pain was elicited by motion of the knee. ? No instability of the knee  right?Knee:  Knee Motion: Value  Active flexion?90 degrees  Active extension 0 degrees  ???  Neurological:  Motor (Strength): ? Weakness of the?right knee was observed.  Skin:  ? No cellulitis.  Wound healing normal. staples  C/D/I.  ???  TESTS  Imaging:  X-Ray Knee:  AP and lateral view x-rays of the right?knee with sunrise view of the patella were performed  ???  IMPRESSIONS RADIOLOGY TEST  X-ray of knee was performed intact?right knee implant.? No signs of loosening or subsidence  Assessment/Plan  1.?Status post total knee replacement, right?Z96.651  ?Staples removed today, Steri-Strips applied. ?He will continue with physical therapy and transition from a walker to a cane and follow-up in 3 months for repeat evaluation.? If he were to develop any worsening?pain, swelling, wound breakdown or issues and he will contact us sooner and we will?see him for earlier evaluation. ?Patient has voiced understanding  Ordered:  Clinic Follow up, *Est. 01/15/22 3:00:00 CST, Order for future visit, Status post total knee replacement, right, [facility]  Post-Op follow-up visit [id number] PC, Status post total knee replacement, right, [facility], 10/15/21 8:59:00 CDT  ?  Referrals  PT/OT Ambulatory Referral, Specialty: Physical Therapy, Start: 10/15/21 8:59:00 CDT, 4, Anit Grav Hip Abductor & Extensor Exc.  Isotonic hamstring & Closed Chain Quad  Stretch and Strengthen  Therapeutic Excercise, Instructions: ****KNEE PT ORDERS****, Status post total knee...  Clinic Follow up, *Est. 01/15/22 3:00:00 CST, Order for future visit, Status post total knee replacement, right, [facility]   Problem List/Past Medical History  Ongoing  Atrial flutter  Bone cyst of right fibula  Central sleep apnea  Edema  Fatigue  Hypertension  Impaired gait and mobility  Obstructive sleep apnea  Pre-op exam  Primary osteoarthritis of right knee  REM behavioral disorder  Snoring  Tobacco user  Historical  Cheyne-Beavers respiration  Hypoxemia  Procedure/Surgical History  Excision of Right Knee Joint, Open Approach (09/30/2021)  AKIRA BOSWELL Total Knee Arthroplasty (Right) (09/30/2021)  Replacement of Right Knee Joint with Synthetic Substitute, Cemented, Open Approach  (09/30/2021)  Robotic Assisted Procedure of Lower Extremity, Open Approach (09/30/2021)  wisdom teeth   Medications  amLODIPine 2.5 mg oral tablet, 2.5 mg= 1 tab(s), Oral, Daily  carvedilol 6.25 mg oral tablet, 6.25 mg= 1 tab(s), Oral, BID  Eliquis 5 mg oral tablet, 5 mg= 1 tab(s), Oral, BID  hydrochlorothiazide 25 mg oral tablet, 25 mg= 1 tab(s), Oral, Daily  polyethylene glycol 3350 oral powder for reconstitution, 17 gm, Oral, Daily  pramipexole 0.25 mg oral tablet, 0.25 mg= 1 tab(s), Oral, At Bedtime, 5 refills  Robaxin 750 mg oral tablet, 750 mg= 1 tab(s), Oral, q8hr, PRN  spironolactone 25 mg oral tablet, 25 mg= 1 tab(s), Oral, Daily  tamsulosin 0.4 mg oral capsule, 0.4 mg= 1 cap(s), Oral, BID,? ?Still taking, not as prescribed: takes twice daily  Tylenol, 500 mg= 1 tab(s), Oral, q4hr  valsartan 320 mg oral tablet, 320 mg= 1 tab(s), Oral, Daily  venlafaxine 75 mg oral tablet, 75 mg= 1 tab(s), Oral, Daily  Allergies  Advil?(unknown)  ibuprofen?(unknown)  Social History  Abuse/Neglect  No, No, Yes, 10/15/2021  No, 09/30/2021  No, 09/23/2021  Alcohol  Current, Beer, Daily, 09/30/2021  Current, Beer, Daily, 09/23/2021  Current, Daily, 07/26/2021  Employment/School  Employed, 09/30/2021  Employed, 09/23/2021  Home/Environment  Lives with Spouse., 09/30/2021  Lives with Spouse., 09/23/2021  Nutrition/Health  Regular, 09/30/2021  Regular, Low fat, 09/23/2021  Substance Use  Never, 09/30/2021  Never, 09/23/2021  Never, 07/26/2021  Tobacco  Smoker, current status unknown, Electronic Device, N/A, 10/15/2021  Family History  Hypertension: Father.  Health Maintenance  Health Maintenance  ???Pending?(in the next year)  ??? ??OverDue  ??? ? ? ?Cognitive Screening due??01/02/21??and every 1??year(s)  ??? ??Due?  ??? ? ? ?ADL Screening due??10/15/21??and every 1??year(s)  ??? ? ? ?Abdominal Aortic Aneurysm Screening due??10/15/21??and every 100??year(s)  ??? ? ? ?Aspirin Therapy for CVD Prevention due??10/15/21??and every  1??year(s)  ??? ? ? ?Colorectal Screening due??10/15/21??Unknown Frequency  ??? ? ? ?Hypertension Management-Education due??10/15/21??and every 1??year(s)  ??? ? ? ?Lipid Screening due??10/15/21??Unknown Frequency  ??? ? ? ?Lung Cancer Screening due??10/15/21??and every 1??year(s)  ??? ? ? ?Medicare Annual Wellness Exam due??10/15/21??and every 1??year(s)  ??? ? ? ?Pneumococcal Vaccine due??10/15/21??Unknown Frequency  ??? ? ? ?Tetanus Vaccine due??10/15/21??and every 10??year(s)  ??? ? ? ?Zoster Vaccine due??10/15/21??Unknown Frequency  ??? ??Due In Future?  ??? ? ? ?Obesity Screening not due until??01/01/22??and every 1??year(s)  ??? ? ? ?Smoking Cessation not due until??01/01/22??and every 1??year(s)  ??? ? ? ?Advance Directive not due until??01/02/22??and every 1??year(s)  ??? ? ? ?Alcohol Misuse Screening not due until??01/02/22??and every 1??year(s)  ??? ? ? ?Fall Risk Assessment not due until??01/02/22??and every 1??year(s)  ??? ? ? ?Functional Assessment not due until??01/02/22??and every 1??year(s)  ??? ? ? ?Depression Screening not due until??07/26/22??and every 1??year(s)  ??? ? ? ?Blood Pressure Screening not due until??09/13/22??and every 1??year(s)  ??? ? ? ?Hypertension Management-Blood Pressure not due until??09/13/22??and every 1??year(s)  ??? ? ? ?Body Mass Index Check not due until??09/23/22??and every 1??year(s)  ??? ? ? ?Hypertension Management-BMP not due until??10/02/22??and every 1??year(s)  ??? ? ? ?Diabetes Screening not due until??10/03/22??and every 1??year(s)  ???Satisfied?(in the past 1 year)  ??? ??Satisfied?  ??? ? ? ?Advance Directive on??09/30/21.??Satisfied by Lisa Yancey RN  ??? ? ? ?Alcohol Misuse Screening on??09/13/21.??Satisfied by JOSIE Young Tinea  ??? ? ? ?Blood Pressure Screening on??10/15/21.??Satisfied by Stephanie Gomez  ??? ? ? ?Body Mass Index Check on??10/15/21.??Satisfied by Stephanie Gomez  ??? ? ? ?Depression Screening on??10/15/21.??Satisfied by  Stephanie Gomez  ??? ? ? ?Diabetes Screening on??10/02/21.??Satisfied by Brenda Hoover  ??? ? ? ?Fall Risk Assessment on??10/15/21.??Satisfied by Stephanie Gomez  ??? ? ? ?Functional Assessment on??10/01/21.??Satisfied by Amber POLK, April  ??? ? ? ?Hypertension Management-Blood Pressure on??10/15/21.??Satisfied by Stephanie Gomez  ??? ? ? ?Influenza Vaccine on??10/15/21.??Satisfied by Stephanie Gomez  ??? ? ? ?Obesity Screening on??10/15/21.??Satisfied by Stephanie Gomez  ??? ? ? ?Smoking Cessation on??09/13/21.??Satisfied by JOSIE Young Tinea  ?

## 2022-05-02 NOTE — HISTORICAL [facility]
This is a historical note converted from [facility]. Formatting and pictures may have been removed.  Please reference [facility] for original formatting and attached multimedia. Admit and Discharge Dates  Admit Date: 10/16/2021  Discharge Date: 10/18/2021  Physicians  Primary Care Physician - Edgar MONTENEGRO, Pilo MATA  Discharge Diagnosis  acute metabolic encephalopathy - resolved  Acute UTI  S/p recent?right Total knee arthroplasty?on 09/30/2021  Chronic atrial flutter on Eliquis  Hypertension  RAVINDER  Ventral Hernia  Surgical Procedures  No procedures recorded for this visit.  Immunizations  No immunizations recorded for this visit.  Hospital Course  71-year-old male with known past medical history of chronic atrial flutter on Eliquis, hypertension, obstructive sleep apnea, history of recent right knee replacement on 9/30/2021 with Dr. Fuentes, 1 incidence of urinary retention requiring Quinonez placement for 2 weeks which has since resolved admitted 10/16/2021 with confusion, hallucination and episodic slurred speech.? Patient wife is at bedside.? Patient report he remembers that he was out of it.? Does remember seeing small children in the room, they were talking to him and he was responding to them as well.? Wife reported that it all started 2 days ago after starting Bactrim for his urinary tract infection.? Patient was on Norco as well as Robaxin.? And did not had that issue till he started the antibiotic.? Currently patient is awake alert and oriented.? Per wife he is at his baseline.? Speech is clear.? Patient was able to provide me the full history.? Requesting ice pack for his right knee  Labs reviewed, vital signs reviewed.? Noted mild WBC elevation.? UA shows 3+ leukocyte esterase.? Urine culture has been sent.? Potassium is slightly elevated at 5.3.? Patient received Lokelma x1  Patient was admitted to hospitalist service was started on Rocephin urine cultures ordered. ?Was also started on IV fluids because of acute kidney  injury patient mental status?improved. ?Cultures were ordered to that remain negative urine culture most likely was a contaminant symptomatically patient improved so he was discharged home in stable condition  ?  Time Spent on discharge  35 min  Objective  Vitals & Measurements  T:?36.5? ?C (Oral)? TMIN:?36.3? ?C (Oral)? TMAX:?37? ?C (Oral)? HR:?79(Peripheral)? RR:?22? BP:?125/76? SpO2:?96%?  Physical Exam  General: ?Alert and awake, No acute distress. ?  Respiratory: Nonlabored  Cardiovascular: ?Normal rate ?  Gastrointestinal: ?Soft ??  Musculoskeletal: ?Moves all four extremities on command  Neurologic: ?awake  Psychiatric: ?Cooperative.  Patient Discharge Condition  stable  Discharge Disposition  home   Discharge Medication Reconciliation  Prescribed  carvedilol (carvedilol 6.25 mg oral tablet)?6.25 mg, Oral, BID  cefdinir (Omnicef 300 mg oral capsule)?300 mg, Oral, q12hr  Continue  acetaminophen (Tylenol)?500 mg, Oral, q4hr  acetaminophen-HYDROcodone (acetaminophen-hydrocodone 325 mg-7.5 mg oral tablet)?1 tab(s), Oral, q6hr  amLODIPine (amLODIPine 2.5 mg oral tablet)?2.5 mg, Oral, Daily  apixaban (Eliquis 5 mg oral tablet)?5 mg, Oral, BID  tamsulosin (tamsulosin 0.4 mg oral capsule)?0.4 mg, Oral, BID  valsartan (valsartan 320 mg oral tablet)?320 mg, Oral, Daily  venlafaxine (venlafaxine 75 mg oral tablet)?75 mg, Oral, Daily  Discontinue  carvedilol (carvedilol 3.125 mg oral tablet)?3.125 mg, Oral, BID  hydrochlorothiazide (hydrochlorothiazide 25 mg oral tablet)?25 mg, Oral, Daily  methocarbamol (methocarbamol 750 mg oral tablet)?750 mg, Oral, TID  pramipexole (pramipexole 0.25 mg oral tablet)?0.25 mg, Oral, At Bedtime  spironolactone (spironolactone 25 mg oral tablet)?25 mg, Oral, Daily  sulfamethoxazole-trimethoprim (sulfamethoxazole-trimethoprim 800 mg-160 mg oral tablet)?1 tab(s), Oral, BID  Education and Orders Provided  Discharge - 10/18/21 9:31:00 CDT, Home?  Follow up  f/u with pcp  Car Seat  Challenge  No Qualifying Data     [1] Progress/SOAP Note; Kofi MONTENEGRO, Kelsey MARTINEZ 10/17/2021 13:13 CDT

## 2022-10-24 ENCOUNTER — HOSPITAL ENCOUNTER (OUTPATIENT)
Dept: RADIOLOGY | Facility: CLINIC | Age: 72
Discharge: HOME | End: 2022-10-24
Attending: SPECIALIST
Payer: MEDICARE

## 2022-10-24 ENCOUNTER — OFFICE VISIT (OUTPATIENT)
Dept: ORTHOPEDICS | Facility: CLINIC | Age: 72
End: 2022-10-24
Payer: MEDICARE

## 2022-10-24 VITALS
HEIGHT: 66 IN | BODY MASS INDEX: 38.57 KG/M2 | WEIGHT: 240 LBS | HEART RATE: 64 BPM | SYSTOLIC BLOOD PRESSURE: 138 MMHG | DIASTOLIC BLOOD PRESSURE: 94 MMHG

## 2022-10-24 DIAGNOSIS — Z47.1 AFTERCARE FOLLOWING RIGHT KNEE JOINT REPLACEMENT SURGERY: Primary | ICD-10-CM

## 2022-10-24 DIAGNOSIS — Z96.651 AFTERCARE FOLLOWING RIGHT KNEE JOINT REPLACEMENT SURGERY: ICD-10-CM

## 2022-10-24 DIAGNOSIS — Z47.1 AFTERCARE FOLLOWING RIGHT KNEE JOINT REPLACEMENT SURGERY: ICD-10-CM

## 2022-10-24 DIAGNOSIS — Z96.651 AFTERCARE FOLLOWING RIGHT KNEE JOINT REPLACEMENT SURGERY: Primary | ICD-10-CM

## 2022-10-24 DIAGNOSIS — Z96.651 HISTORY OF TOTAL KNEE ARTHROPLASTY, RIGHT: ICD-10-CM

## 2022-10-24 PROCEDURE — 73564 XR KNEE COMP 4 OR MORE VIEWS RIGHT: ICD-10-PCS | Mod: RT,,, | Performed by: SPECIALIST

## 2022-10-24 PROCEDURE — 99213 OFFICE O/P EST LOW 20 MIN: CPT | Mod: ,,, | Performed by: SPECIALIST

## 2022-10-24 PROCEDURE — 73564 X-RAY EXAM KNEE 4 OR MORE: CPT | Mod: RT,,, | Performed by: SPECIALIST

## 2022-10-24 PROCEDURE — 99213 PR OFFICE/OUTPT VISIT, EST, LEVL III, 20-29 MIN: ICD-10-PCS | Mod: ,,, | Performed by: SPECIALIST

## 2022-10-24 RX ORDER — APIXABAN 5 MG/1
5 TABLET, FILM COATED ORAL 2 TIMES DAILY
COMMUNITY
Start: 2022-10-17

## 2022-10-24 RX ORDER — CARVEDILOL 6.25 MG/1
6.25 TABLET ORAL 2 TIMES DAILY
COMMUNITY
Start: 2022-09-23

## 2022-10-24 RX ORDER — HYDROCHLOROTHIAZIDE 25 MG/1
25 TABLET ORAL DAILY
COMMUNITY
Start: 2022-07-26

## 2022-10-24 RX ORDER — TAMSULOSIN HYDROCHLORIDE 0.4 MG/1
2 CAPSULE ORAL DAILY
COMMUNITY
Start: 2022-09-26

## 2022-10-24 RX ORDER — AMLODIPINE BESYLATE 2.5 MG/1
2.5 TABLET ORAL DAILY
COMMUNITY
Start: 2022-09-23

## 2022-10-24 RX ORDER — VENLAFAXINE HYDROCHLORIDE 75 MG/1
75 CAPSULE, EXTENDED RELEASE ORAL DAILY
COMMUNITY
Start: 2022-08-22

## 2022-10-24 RX ORDER — PRAMIPEXOLE DIHYDROCHLORIDE 0.5 MG/1
0.5 TABLET ORAL NIGHTLY
COMMUNITY
Start: 2022-10-17

## 2022-10-24 RX ORDER — VALSARTAN 320 MG/1
320 TABLET ORAL DAILY
COMMUNITY
Start: 2022-09-26

## 2022-10-24 NOTE — PROGRESS NOTES
History reviewed. No pertinent past medical history.    Past Surgical History:   Procedure Laterality Date    KNEE SURGERY Right 09/30/2021    right TKA       Current Outpatient Medications   Medication Sig    amLODIPine (NORVASC) 2.5 MG tablet Take 2.5 mg by mouth once daily.    carvediloL (COREG) 6.25 MG tablet Take 6.25 mg by mouth 2 (two) times daily.    ELIQUIS 5 mg Tab Take 5 mg by mouth 2 (two) times daily.    hydroCHLOROthiazide (HYDRODIURIL) 25 MG tablet Take 25 mg by mouth once daily.    pramipexole (MIRAPEX) 0.5 MG tablet Take 0.5 mg by mouth every evening.    tamsulosin (FLOMAX) 0.4 mg Cap Take 2 capsules by mouth once daily.    valsartan (DIOVAN) 320 MG tablet Take 320 mg by mouth once daily.    venlafaxine (EFFEXOR-XR) 75 MG 24 hr capsule Take 75 mg by mouth once daily.     No current facility-administered medications for this visit.       Review of patient's allergies indicates:  No Known Allergies    History reviewed. No pertinent family history.    Social History     Socioeconomic History    Marital status:    Tobacco Use    Smoking status: Every Day     Types: Vaping with nicotine    Smokeless tobacco: Never   Substance and Sexual Activity    Alcohol use: Yes    Drug use: Never    Sexual activity: Not Currently       Chief Complaint:   Chief Complaint   Patient presents with    Right Knee - Follow-up     follow-up for for right total knee sx on 9/30/22. Knee is doing good. No pain or numbness. Able to walk and ok and bend it well. No issues or complaints.       Consulting Physician: No ref. provider found    History of present illness:    This is a 72 y.o. year old male who is doing great with no pain in his right knee 1 year postop from right total knee arthroplasty.  He has no limp.  He has no impairment.  He has no pain.  He has no instability or giving way.  Review of Systems:    Constitution:   Denies chills, fever, and sweats.  HENT:   Denies headaches or blurry  "vision.  Cardiovascular:  Denies chest pain or irregular heart beat.  Respiratory:   Denies cough or shortness of breath.  Gastrointestinal:  Denies abdominal pain, nausea, or vomiting.  Musculoskeletal:   Denies muscle cramps.  Neurological:   Denies dizziness or focal weakness.  Psychiatric/Behavior: Normal mental status.  Hematology/Lymph:  Denies bleeding problem or easy bruising/bleeding.  Skin:    Denies rash or suspicious lesions.    Examination:    Vital Signs:    Vitals:    10/24/22 1031   BP: (!) 138/94   Pulse: 64   Weight: 108.9 kg (240 lb)   Height: 5' 6" (1.676 m)       Body mass index is 38.74 kg/m².    Constitution:   Well-developed, well nourished patient in no acute distress.  Neurological:   Alert and oriented x 3 and cooperative to examination.     Psychiatric/Behavior: Normal mental status.  Respiratory:   No shortness of breath.  Eyes:    Extraoccular muscles intact  Skin:    No scars, rash or suspicious lesions.    Physical Exam:  Right knee exam:   2+ pulses   Intact sensation and motor function   No atrophy   Range of motion 0° to 125° with symmetrical balanced collateral ligaments throughout range of motion   Normal gait with no tenderness   No palpable tenderness   No swelling, no redness, no increased heat, no effusion    Imaging: X-rays ordered and images interpreted today personally by me of four views of the right knee which show pristine interfaces and a stable well-aligned right total knee arthroplasty with no evidence of loosening.  Impression:  Stable right total knee arthroplasty.         Assessment: Aftercare following right knee joint replacement surgery  -     X-Ray Knee Complete 4 Or More Views Right; Future; Expected date: 10/24/2022    History of total knee arthroplasty, right        Plan:  Activities as tolerated  Follow-up p.r.n.      DISCLAIMER: This note may have been dictated using voice recognition software and may contain grammatical errors.     NOTE: Consult report " sent to referring provider via [employer]

## 2023-08-24 ENCOUNTER — TELEPHONE (OUTPATIENT)
Dept: ORTHOPEDICS | Facility: CLINIC | Age: 73
End: 2023-08-24
Payer: MEDICARE

## 2023-08-24 NOTE — TELEPHONE ENCOUNTER
Patient called and asked if he still needs abx for dental work.     I called the patient back and let him know that yes, he will need to have this done until 09/30/2024.     He verbalized a clean understanding and stated that he will call for abx once he gets an appointment.     I let him know that this is fine.

## 2023-10-17 NOTE — TELEPHONE ENCOUNTER
Patient's dentist office called making sure he needs ABX before his dental procedure tomorrow.     I called the dentist office to let her know that I received her voice mail and that since the patient's RT TKA surgery was over 2 years ago, he doesn't need ABX.     She verbalized a clear understanding.

## 2025-04-01 ENCOUNTER — LAB VISIT (OUTPATIENT)
Dept: LAB | Facility: HOSPITAL | Age: 75
End: 2025-04-01
Attending: FAMILY MEDICINE
Payer: MEDICARE

## 2025-04-01 DIAGNOSIS — E11.9 DIABETES MELLITUS WITHOUT COMPLICATION: ICD-10-CM

## 2025-04-01 DIAGNOSIS — E78.5 HYPERLIPIDEMIA, UNSPECIFIED HYPERLIPIDEMIA TYPE: ICD-10-CM

## 2025-04-01 DIAGNOSIS — G63 GOUTY NEURITIS: ICD-10-CM

## 2025-04-01 DIAGNOSIS — M10.00 GOUTY NEURITIS: ICD-10-CM

## 2025-04-01 DIAGNOSIS — I10 ESSENTIAL HYPERTENSION, MALIGNANT: Primary | ICD-10-CM

## 2025-04-01 LAB
ALBUMIN SERPL-MCNC: 3.7 G/DL (ref 3.4–4.8)
ALBUMIN/GLOB SERPL: 1.1 RATIO (ref 1.1–2)
ALP SERPL-CCNC: 77 UNIT/L (ref 40–150)
ALT SERPL-CCNC: 25 UNIT/L (ref 0–55)
ANION GAP SERPL CALC-SCNC: 8 MEQ/L
AST SERPL-CCNC: 20 UNIT/L (ref 11–45)
BASOPHILS # BLD AUTO: 0.05 X10(3)/MCL
BASOPHILS NFR BLD AUTO: 0.6 %
BILIRUB SERPL-MCNC: 0.6 MG/DL
BUN SERPL-MCNC: 22.7 MG/DL (ref 8.4–25.7)
CALCIUM SERPL-MCNC: 9.3 MG/DL (ref 8.8–10)
CHLORIDE SERPL-SCNC: 105 MMOL/L (ref 98–107)
CHOLEST SERPL-MCNC: 203 MG/DL
CHOLEST/HDLC SERPL: 5 {RATIO} (ref 0–5)
CO2 SERPL-SCNC: 27 MMOL/L (ref 23–31)
CREAT SERPL-MCNC: 0.99 MG/DL (ref 0.72–1.25)
CREAT/UREA NIT SERPL: 23
EOSINOPHIL # BLD AUTO: 0.14 X10(3)/MCL (ref 0–0.9)
EOSINOPHIL NFR BLD AUTO: 1.8 %
ERYTHROCYTE [DISTWIDTH] IN BLOOD BY AUTOMATED COUNT: 13.3 % (ref 11.5–17)
EST. AVERAGE GLUCOSE BLD GHB EST-MCNC: 134.1 MG/DL
GFR SERPLBLD CREATININE-BSD FMLA CKD-EPI: >60 ML/MIN/1.73/M2
GLOBULIN SER-MCNC: 3.3 GM/DL (ref 2.4–3.5)
GLUCOSE SERPL-MCNC: 118 MG/DL (ref 82–115)
HBA1C MFR BLD: 6.3 %
HCT VFR BLD AUTO: 52 % (ref 42–52)
HDLC SERPL-MCNC: 45 MG/DL (ref 35–60)
HGB BLD-MCNC: 16.8 G/DL (ref 14–18)
IMM GRANULOCYTES # BLD AUTO: 0.02 X10(3)/MCL (ref 0–0.04)
IMM GRANULOCYTES NFR BLD AUTO: 0.3 %
LDLC SERPL CALC-MCNC: 98 MG/DL (ref 50–140)
LYMPHOCYTES # BLD AUTO: 1.53 X10(3)/MCL (ref 0.6–4.6)
LYMPHOCYTES NFR BLD AUTO: 19.4 %
MCH RBC QN AUTO: 30.9 PG (ref 27–31)
MCHC RBC AUTO-ENTMCNC: 32.3 G/DL (ref 33–36)
MCV RBC AUTO: 95.8 FL (ref 80–94)
MONOCYTES # BLD AUTO: 0.81 X10(3)/MCL (ref 0.1–1.3)
MONOCYTES NFR BLD AUTO: 10.3 %
NEUTROPHILS # BLD AUTO: 5.32 X10(3)/MCL (ref 2.1–9.2)
NEUTROPHILS NFR BLD AUTO: 67.6 %
PLATELET # BLD AUTO: 191 X10(3)/MCL (ref 130–400)
PMV BLD AUTO: 9.8 FL (ref 7.4–10.4)
POTASSIUM SERPL-SCNC: 4 MMOL/L (ref 3.5–5.1)
PROT SERPL-MCNC: 7 GM/DL (ref 5.8–7.6)
RBC # BLD AUTO: 5.43 X10(6)/MCL (ref 4.7–6.1)
SODIUM SERPL-SCNC: 140 MMOL/L (ref 136–145)
TRIGL SERPL-MCNC: 298 MG/DL (ref 34–140)
URATE SERPL-MCNC: 4.1 MG/DL (ref 3.5–7.2)
VLDLC SERPL CALC-MCNC: 60 MG/DL
WBC # BLD AUTO: 7.87 X10(3)/MCL (ref 4.5–11.5)

## 2025-04-01 PROCEDURE — 80061 LIPID PANEL: CPT

## 2025-04-01 PROCEDURE — 80053 COMPREHEN METABOLIC PANEL: CPT

## 2025-04-01 PROCEDURE — 84550 ASSAY OF BLOOD/URIC ACID: CPT

## 2025-04-01 PROCEDURE — 36415 COLL VENOUS BLD VENIPUNCTURE: CPT

## 2025-04-01 PROCEDURE — 83036 HEMOGLOBIN GLYCOSYLATED A1C: CPT

## 2025-04-01 PROCEDURE — 85025 COMPLETE CBC W/AUTO DIFF WBC: CPT

## 2025-05-26 ENCOUNTER — HOSPITAL ENCOUNTER (EMERGENCY)
Facility: HOSPITAL | Age: 75
Discharge: HOME | End: 2025-05-26
Attending: EMERGENCY MEDICINE
Payer: MEDICARE

## 2025-05-26 VITALS
RESPIRATION RATE: 18 BRPM | SYSTOLIC BLOOD PRESSURE: 161 MMHG | WEIGHT: 250 LBS | HEIGHT: 65 IN | TEMPERATURE: 98 F | HEART RATE: 65 BPM | BODY MASS INDEX: 41.65 KG/M2 | DIASTOLIC BLOOD PRESSURE: 95 MMHG | OXYGEN SATURATION: 95 %

## 2025-05-26 DIAGNOSIS — R07.9 CHEST PAIN: ICD-10-CM

## 2025-05-26 DIAGNOSIS — S22.32XA CLOSED FRACTURE OF ONE RIB OF LEFT SIDE, INITIAL ENCOUNTER: Primary | ICD-10-CM

## 2025-05-26 PROCEDURE — 93010 ELECTROCARDIOGRAM REPORT: CPT | Mod: ,,, | Performed by: INTERNAL MEDICINE

## 2025-05-26 PROCEDURE — 93005 ELECTROCARDIOGRAM TRACING: CPT

## 2025-05-26 PROCEDURE — 99284 EMERGENCY DEPT VISIT MOD MDM: CPT | Mod: 25

## 2025-05-26 RX ORDER — HYDROCODONE BITARTRATE AND ACETAMINOPHEN 7.5; 325 MG/1; MG/1
1 TABLET ORAL EVERY 6 HOURS PRN
Qty: 12 TABLET | Refills: 0 | Status: SHIPPED | OUTPATIENT
Start: 2025-05-26

## 2025-05-26 RX ORDER — IBUPROFEN 800 MG/1
800 TABLET, FILM COATED ORAL EVERY 8 HOURS PRN
Qty: 30 TABLET | Refills: 0 | Status: SHIPPED | OUTPATIENT
Start: 2025-05-26

## 2025-05-26 NOTE — ED PROVIDER NOTES
NAME:  Nico Devi  CSN:     [id number]  MRN:    [id number]  ADMIT DATE: 5/26/2025        eMERGENCY dEPARTMENT eNCOUnter    CHIEF COMPLAINT    Chief Complaint   Patient presents with    Rib Injury     Pt fell getting into jeep yesterday.   Complaints of left sided rib pain with movement . Pt states he does not think he hit his head, he denies loc. Is on eliquis.         HPI      Nico Devi is a 74 y.o. male who presents to the ED for evaluation after a fall.  Patient has suffered a fall yesterday getting out of his cheek and landed on his left side.  Patient has pain to the left mid back as well as the left side of the chest.  He reports the pain is worse when he goes from a sitting to a standing position.  He denies any shortness of breath.  Patient is on Eliquis.  He denies striking his head.          ALLERGIES    Review of patient's allergies indicates:   Allergen Reactions    Ibuprofen Other (See Comments)     Other Reaction(s): unknown    Red in face and loss of voice       PAST MEDICAL HISTORY  History reviewed. No pertinent past medical history.    SURGICAL HISTORY    Past Surgical History:   Procedure Laterality Date    KNEE SURGERY Right 09/30/2021    right TKA       SOCIAL HISTORY    Social History     Socioeconomic History    Marital status:    Tobacco Use    Smoking status: Every Day     Types: Vaping with nicotine    Smokeless tobacco: Never   Substance and Sexual Activity    Alcohol use: Yes    Drug use: Never    Sexual activity: Not Currently       FAMILY HISTORY    No family history on file.    REVIEW OF SYSTEMS   ROS  All Systems otherwise negative except as noted in the History of Present Illness.        PHYSICAL EXAM    Reviewed Triage Note  VITAL SIGNS:   ED Triage Vitals [05/26/25 1324]   Encounter Vitals Group      BP (!) 161/95      Systolic BP Percentile       Diastolic BP Percentile       Pulse 65      Resp 18      Temp 97.9 °F (36.6 °C)      Temp src       SpO2 95 %       "Weight 250 lb      Height 5' 5"      Head Circumference       Peak Flow       Pain Score       Pain Loc       Pain Education       Exclude from Growth Chart        Patient Vitals for the past 24 hrs:   BP Temp Pulse Resp SpO2 Height Weight   05/26/25 1324 (!) 161/95 97.9 °F (36.6 °C) 65 18 95 % 5' 5" (1.651 m) 113.4 kg (250 lb)           Physical Exam    Constitutional:  Well-developed, well-nourished. No acute distress  HENT:  Normocephalic, atraumatic.  Eyes:  EOMI. Conjunctiva normal without discharge.   Neck: Normal range of motion.No stridor. No meningismus.   Respiratory:  Normal breath sounds bilaterally.  No respiratory distress, retractions, or conversational dyspnea. No wheezing. No rhonchi. No rales.   Cardiovascular:  Normal heart rate. Normal rhythm. No pitting lower extremity edema.   GI:  Abdomen soft, non-distended, non-tender. Normal bowel sounds. No guarding, rigidity or rebound.    : No CVA tenderness.   Musculoskeletal:  No gross deformity or limited range of motion of all major joints. No palpable bony deformity. No tenderness to palpation.  Integument:  Warm and dry. No rash.  Neurologic:  Normal motor function. Normal sensory function. No focal deficits noted. Alert and Interactive.  Psychiatric:  Affect normal. Mood normal.         LABS  Pertinent labs reviewed. (See chart for details)   Labs Reviewed - No data to display      RADIOLOGY    Imaging Results              XR Ribs Min 3 views w/PA Chest Left (Final result)  Result time 05/26/25 14:52:38      Final result by Belkis Jenkins MD (05/26/25 14:52:38)                   Impression:      Mildly displaced fracture of the left posterolateral 5th rib.      Electronically signed by: Belkis Jenkins  Date:    05/26/2025  Time:    14:52               Narrative:    EXAMINATION:  XR RIBS MIN 3 VIEWS W/ PA CHEST LEFT    CLINICAL HISTORY:  fall;    COMPARISON:  Chest x-ray dated 10/16/2021    FINDINGS:  Heart is stable in size.  No focal " airspace consolidation.  There is no pleural effusion or visible pneumothorax.  There is a mildly displaced fracture of the left posterolateral 5th rib.                                      PROCEDURES    Procedures      EKG     Interpreted by ERP:     EKG Readings: (Independently Interpreted)   Rhythm: Atrial Fibrillation. Heart Rate: 67. Ectopy: No Ectopy. Conduction: Normal. ST Segments: Normal ST Segments. T Waves: Normal. Clinical Impression: Atrial Fibrillation       ED COURSE & MEDICAL DECISION MAKING    Pertinent & Imaging studies reviewed. (See chart for details and specific orders.)        Medications - No data to display       Mildly displaced posterolateral left 5th rib fracture noted on x-ray.  EKG is within normal limits.  Patient's chest pain seems very musculoskeletal in nature secondary to the fall.  He was given ED return precautions and will be discharged with pain control       DISPOSITION  Patient discharged in stable condition at No discharge date for patient encounter.      DISCHARGE INSTRUCTIONS & MEDS       Medication List        START taking these medications      HYDROcodone-acetaminophen 7.5-325 mg per tablet  Commonly known as: NORCO  Take 1 tablet by mouth every 6 (six) hours as needed for Pain.     ibuprofen 800 MG tablet  Commonly known as: ADVIL,MOTRIN  Take 1 tablet (800 mg total) by mouth every 8 (eight) hours as needed for Pain.            ASK your doctor about these medications      amLODIPine 2.5 MG tablet  Commonly known as: NORVASC     carvediloL 6.25 MG tablet  Commonly known as: COREG     ELIQUIS 5 mg Tab  Generic drug: apixaban     hydroCHLOROthiazide 25 MG tablet  Commonly known as: HYDRODIURIL     pramipexole 0.5 MG tablet  Commonly known as: MIRAPEX     tamsulosin 0.4 mg Cap  Commonly known as: FLOMAX     valsartan 320 MG tablet  Commonly known as: DIOVAN     venlafaxine 75 MG 24 hr capsule  Commonly known as: EFFEXOR-XR               Where to Get Your Medications         These medications were sent to [facility] - [place], [place] - [address] [place] [address], [facility] [address]      Phone: [phone]   HYDROcodone-acetaminophen 7.5-325 mg per tablet  ibuprofen 800 MG tablet           New Prescriptions    HYDROCODONE-ACETAMINOPHEN (NORCO) 7.5-325 MG PER TABLET    Take 1 tablet by mouth every 6 (six) hours as needed for Pain.    IBUPROFEN (ADVIL,MOTRIN) 800 MG TABLET    Take 1 tablet (800 mg total) by mouth every 8 (eight) hours as needed for Pain.           FINAL IMPRESSION    1. Closed fracture of one rib of left side, initial encounter    2. Chest pain              Blood Pressure Follow-Up Advised  Patient advised to follow up with PCP within 3-5 days for blood pressure re-check if blood pressure is equal to or greater than 120/80.         Critical care time spent with this patient (not including separately billable items) was  0 minutes.     DISCLAIMER: This note was prepared with Dragon NaturallySpeaking voice recognition transcription software. Garbled syntax, mangled pronouns, and other bizarre constructions may be attributed to that software system.      Blaze Vidal MD  05/26/2025  3:07 PM           Blaze Vidal MD  05/26/25 [id number]

## 2025-05-28 LAB
OHS QRS DURATION: 78 MS
OHS QRS DURATION: 82 MS
OHS QTC CALCULATION: 393 MS
OHS QTC CALCULATION: 410 MS